# Patient Record
Sex: FEMALE | Race: WHITE | NOT HISPANIC OR LATINO | URBAN - METROPOLITAN AREA
[De-identification: names, ages, dates, MRNs, and addresses within clinical notes are randomized per-mention and may not be internally consistent; named-entity substitution may affect disease eponyms.]

---

## 2023-12-26 ENCOUNTER — OFFICE VISIT (OUTPATIENT)
Dept: PRIMARY CARE | Facility: CLINIC | Age: 33
End: 2023-12-26
Payer: COMMERCIAL

## 2023-12-26 VITALS
OXYGEN SATURATION: 97 % | SYSTOLIC BLOOD PRESSURE: 128 MMHG | TEMPERATURE: 98 F | RESPIRATION RATE: 16 BRPM | HEIGHT: 60 IN | BODY MASS INDEX: 36.32 KG/M2 | DIASTOLIC BLOOD PRESSURE: 90 MMHG | HEART RATE: 78 BPM | WEIGHT: 185 LBS

## 2023-12-26 DIAGNOSIS — J01.00 ACUTE MAXILLARY SINUSITIS, RECURRENCE NOT SPECIFIED: Primary | ICD-10-CM

## 2023-12-26 DIAGNOSIS — Z00.00 WELLNESS EXAMINATION: ICD-10-CM

## 2023-12-26 PROBLEM — K21.9 GERD (GASTROESOPHAGEAL REFLUX DISEASE): Status: ACTIVE | Noted: 2023-12-26

## 2023-12-26 PROBLEM — L30.9 ECZEMA: Status: ACTIVE | Noted: 2023-12-26

## 2023-12-26 PROBLEM — S93.409A ANKLE SPRAIN: Status: RESOLVED | Noted: 2023-12-26 | Resolved: 2023-12-26

## 2023-12-26 PROBLEM — R79.89 ELEVATED LFTS: Status: ACTIVE | Noted: 2023-12-26

## 2023-12-26 PROBLEM — J30.2 SEASONAL AND PERENNIAL ALLERGIC RHINITIS: Status: ACTIVE | Noted: 2023-11-17

## 2023-12-26 PROBLEM — R03.0 ELEVATED BLOOD PRESSURE READING: Status: RESOLVED | Noted: 2023-12-26 | Resolved: 2023-12-26

## 2023-12-26 PROBLEM — L81.9 CHANGING PIGMENTED SKIN LESION: Status: RESOLVED | Noted: 2023-12-26 | Resolved: 2023-12-26

## 2023-12-26 PROBLEM — J30.89 SEASONAL AND PERENNIAL ALLERGIC RHINITIS: Status: ACTIVE | Noted: 2023-11-17

## 2023-12-26 PROBLEM — E55.9 VITAMIN D DEFICIENCY: Status: ACTIVE | Noted: 2023-12-26

## 2023-12-26 PROCEDURE — 1036F TOBACCO NON-USER: CPT

## 2023-12-26 PROCEDURE — 99214 OFFICE O/P EST MOD 30 MIN: CPT

## 2023-12-26 RX ORDER — AZITHROMYCIN 250 MG/1
TABLET, FILM COATED ORAL
Qty: 6 TABLET | Refills: 0 | Status: SHIPPED | OUTPATIENT
Start: 2023-12-26 | End: 2023-12-31

## 2023-12-26 RX ORDER — FLUTICASONE PROPIONATE 50 MCG
1 SPRAY, SUSPENSION (ML) NASAL DAILY
Qty: 16 G | Refills: 2 | Status: SHIPPED | OUTPATIENT
Start: 2023-12-26 | End: 2024-12-25

## 2023-12-26 RX ORDER — NORGESTIMATE AND ETHINYL ESTRADIOL 0.25-0.035
1 KIT ORAL DAILY
COMMUNITY

## 2023-12-26 ASSESSMENT — ENCOUNTER SYMPTOMS
SINUS COMPLAINT: 1
HEADACHES: 0
COUGH: 1
SINUS PRESSURE: 1

## 2023-12-26 NOTE — PROGRESS NOTES
Subjective   Patient ID: Laura Ralph is a 33 y.o. female who presents for Sinus Problem.    Sinus Problem  This is a new problem. The current episode started 1 to 4 weeks ago. The problem has been gradually improving since onset. The maximum temperature recorded prior to her arrival was 101 - 101.9 F. The fever has been present for 3 to 4 days. Associated symptoms include congestion, coughing, ear pain, sinus pressure and sneezing. Pertinent negatives include no headaches. (Chills and sore throat resolved ) Treatments tried: mucinex, nyquil, advil. The treatment provided no relief.        Review of Systems   HENT:  Positive for congestion, ear pain, sinus pressure and sneezing.    Respiratory:  Positive for cough.    Neurological:  Negative for headaches.       Objective   /90   Pulse 78   Temp 36.7 °C (98 °F)   Resp 16   Ht 1.524 m (5')   Wt 83.9 kg (185 lb)   SpO2 97%   BMI 36.13 kg/m²     Physical Exam  Constitutional:       Appearance: Normal appearance.   HENT:      Head: Normocephalic.      Comments: maxillary sinus tenderness bilaterally      Right Ear: There is impacted cerumen.      Left Ear: Tympanic membrane normal.      Nose:      Comments: +erythema     Mouth/Throat:      Pharynx: Posterior oropharyngeal erythema present. No oropharyngeal exudate.   Eyes:      Conjunctiva/sclera: Conjunctivae normal.   Cardiovascular:      Rate and Rhythm: Normal rate and regular rhythm.   Pulmonary:      Effort: Pulmonary effort is normal.      Breath sounds: Normal breath sounds.   Musculoskeletal:      Cervical back: Neck supple.   Lymphadenopathy:      Cervical: No cervical adenopathy.   Skin:     General: Skin is warm and dry.   Neurological:      Mental Status: She is alert and oriented to person, place, and time.   Psychiatric:         Mood and Affect: Mood normal.       Assessment/Plan   Please finish antibiotics completely and continue with adequate water consumption. Flonase nightly. We  discussed benefits and risks of each medication.   Diagnoses and all orders for this visit:  Acute maxillary sinusitis, recurrence not specified  -     azithromycin (Zithromax) 250 mg tablet; Take 2 tablets (500 mg) by mouth once daily for 1 day, THEN 1 tablet (250 mg) once daily for 4 days. Take 2 tabs (500 mg) by mouth today, than 1 daily for 4 days..  -     fluticasone (Flonase) 50 mcg/actuation nasal spray; Administer 1 spray into each nostril once daily. Shake gently. Before first use, prime pump. After use, clean tip and replace cap.  Wellness examination  -     Follow Up In [facility] - PCP - Health Maintenance; Future

## 2024-06-26 ENCOUNTER — APPOINTMENT (OUTPATIENT)
Dept: PRIMARY CARE | Facility: CLINIC | Age: 34
End: 2024-06-26
Payer: COMMERCIAL

## 2024-10-21 ENCOUNTER — OFFICE VISIT (OUTPATIENT)
Dept: PRIMARY CARE | Facility: CLINIC | Age: 34
End: 2024-10-21
Payer: COMMERCIAL

## 2024-10-21 VITALS
RESPIRATION RATE: 20 BRPM | HEART RATE: 75 BPM | WEIGHT: 197 LBS | TEMPERATURE: 97.8 F | BODY MASS INDEX: 38.47 KG/M2 | DIASTOLIC BLOOD PRESSURE: 82 MMHG | SYSTOLIC BLOOD PRESSURE: 128 MMHG | OXYGEN SATURATION: 98 %

## 2024-10-21 DIAGNOSIS — H61.23 BILATERAL IMPACTED CERUMEN: Primary | ICD-10-CM

## 2024-10-21 PROCEDURE — 99213 OFFICE O/P EST LOW 20 MIN: CPT | Performed by: NURSE PRACTITIONER

## 2024-10-21 PROCEDURE — 1036F TOBACCO NON-USER: CPT | Performed by: NURSE PRACTITIONER

## 2024-10-21 PROCEDURE — 69209 REMOVE IMPACTED EAR WAX UNI: CPT | Performed by: NURSE PRACTITIONER

## 2024-10-21 ASSESSMENT — ENCOUNTER SYMPTOMS
CHILLS: 0
HEADACHES: 0
FEVER: 0
APPETITE CHANGE: 0
CONSTIPATION: 0
SLEEP DISTURBANCE: 0
DIZZINESS: 0
DIARRHEA: 0
NAUSEA: 0
VOMITING: 0
COUGH: 0
ACTIVITY CHANGE: 0

## 2024-10-21 NOTE — PROGRESS NOTES
Subjective   Patient ID: Laura Ralph is a 33 y.o. female who presents for Earache (Right ear-over a month/Used debrox and flushed out then worse pain/).    R ear pain x1 month  Got worse over the weekend  Tried to clean w/Debrox and hydrogen peroxide  Denies any cold symptoms  No fever or chills  No dizziness      OTC-advil      Earache   There is pain in the right ear. This is a new problem. Episode onset: over a month. The problem occurs every few hours. The problem has been gradually worsening. There has been no fever. The pain is at a severity of 2/10. Pertinent negatives include no coughing, diarrhea, headaches or vomiting. Treatments tried: debrox and peroxide/warm water. The treatment provided no relief.        Review of Systems   Constitutional:  Negative for activity change, appetite change, chills and fever.   HENT:  Positive for ear pain. Negative for congestion.    Respiratory:  Negative for cough.    Gastrointestinal:  Negative for constipation, diarrhea, nausea and vomiting.   Neurological:  Negative for dizziness and headaches.   Psychiatric/Behavioral:  Negative for sleep disturbance.        Objective   /82   Pulse 75   Temp 36.6 °C (97.8 °F)   Resp 20   Wt 89.4 kg (197 lb)   SpO2 98%   Breastfeeding Unknown   BMI 38.47 kg/m²     Physical Exam  Vitals reviewed.   Constitutional:       General: She is not in acute distress.     Appearance: Normal appearance. She is not ill-appearing or toxic-appearing.   HENT:      Head: Normocephalic.      Right Ear: Ear canal and external ear normal. There is impacted cerumen.      Left Ear: Ear canal and external ear normal. There is impacted cerumen.      Nose: Nose normal.      Mouth/Throat:      Lips: Pink.      Mouth: Mucous membranes are moist.   Eyes:      Extraocular Movements: Extraocular movements intact.      Conjunctiva/sclera: Conjunctivae normal.      Pupils: Pupils are equal, round, and reactive to light.   Cardiovascular:       Rate and Rhythm: Normal rate and regular rhythm.      Pulses: Normal pulses.      Heart sounds: Normal heart sounds.   Pulmonary:      Effort: Pulmonary effort is normal.      Breath sounds: Normal breath sounds.   Musculoskeletal:      Cervical back: Normal range of motion and neck supple.   Skin:     General: Skin is warm and dry.      Capillary Refill: Capillary refill takes less than 2 seconds.   Neurological:      General: No focal deficit present.      Mental Status: She is alert and oriented to person, place, and time.   Psychiatric:         Mood and Affect: Mood normal.         Behavior: Behavior normal.       Patient ID: Laura Ralph is a 33 y.o. female.    Ear Cerumen Removal    Date/Time: 10/21/2024 1:59 PM    Performed by: STEVE Kathleen  Authorized by: STEVE Kathleen    Consent:     Consent obtained:  Verbal    Consent given by:  Patient    Risks, benefits, and alternatives were discussed: yes      Risks discussed:  TM perforation, dizziness and incomplete removal    Alternatives discussed:  No treatment  Universal protocol:     Procedure explained and questions answered to patient or proxy's satisfaction: yes      Relevant documents present and verified: yes      Test results available: no      Imaging studies available: no      Required blood products, implants, devices, and special equipment available: yes      Site/side marked: yes      Immediately prior to procedure, a time out was called: yes      Patient identity confirmed:  Verbally with patient  Procedure details:     Location:  L ear and R ear    Procedure type: irrigation      Procedure outcomes: cerumen removed    Post-procedure details:     Inspection:  Ear canal clear    Hearing quality:  Improved    Procedure completion:  Tolerated    Assessment/Plan   Diagnoses and all orders for this visit:  Bilateral impacted cerumen    Bilateral ears irrigated successfully. Pt tolerated well and states improvement  Encouraged  daily use of Flonase and Zyrtec for symptom support  Debrox as needed  Can use Tylenol and Motrin as needed for fever or pain  Follow up with PCP if not improving over the next 3-4 days  ER for any SOB, difficulty breathing, uncontrolled fevers or worsening of symptoms